# Patient Record
Sex: FEMALE | Race: WHITE | NOT HISPANIC OR LATINO | URBAN - METROPOLITAN AREA
[De-identification: names, ages, dates, MRNs, and addresses within clinical notes are randomized per-mention and may not be internally consistent; named-entity substitution may affect disease eponyms.]

---

## 2023-08-15 ENCOUNTER — TELEPHONE (OUTPATIENT)
Dept: PEDIATRICS | Facility: CLINIC | Age: 23
End: 2023-08-15
Payer: COMMERCIAL

## 2023-08-23 ENCOUNTER — OFFICE VISIT (OUTPATIENT)
Dept: PEDIATRICS | Facility: CLINIC | Age: 23
End: 2023-08-23
Payer: COMMERCIAL

## 2023-08-23 VITALS — WEIGHT: 248 LBS | BODY MASS INDEX: 40.64 KG/M2 | TEMPERATURE: 95.8 F

## 2023-08-23 DIAGNOSIS — F90.9 ATTENTION DEFICIT HYPERACTIVITY DISORDER (ADHD), UNSPECIFIED ADHD TYPE: Primary | ICD-10-CM

## 2023-08-23 PROCEDURE — 99213 OFFICE O/P EST LOW 20 MIN: CPT | Performed by: PEDIATRICS

## 2023-08-23 RX ORDER — LISDEXAMFETAMINE DIMESYLATE 40 MG/1
40 CAPSULE ORAL DAILY
COMMUNITY
End: 2023-08-23 | Stop reason: SDUPTHER

## 2023-08-23 RX ORDER — LISDEXAMFETAMINE DIMESYLATE 40 MG/1
40 CAPSULE ORAL DAILY
Qty: 30 CAPSULE | Refills: 0 | Status: SHIPPED | OUTPATIENT
Start: 2023-08-23

## 2023-08-23 NOTE — PROGRESS NOTES
Subjective   Patient ID: Ruth Powers is a 23 y.o. female who presents for MED CHECK.  HPI  Only current medication is Vyvanse 40mg  Just graduated college- gap year  International Development degree, ? Grad school  Happy with dose  No appetite or sleep disturbance  Living at home  Renewed controlled substance agreement  Review of Systems    Objective   Physical Exam  Constitutional:       Appearance: Normal appearance.   Neurological:      Mental Status: She is alert.         Assessment/Plan        Refilled rx  Will transition to adult PCP

## 2023-10-03 ENCOUNTER — LAB REQUISITION (OUTPATIENT)
Dept: LAB | Facility: HOSPITAL | Age: 23
End: 2023-10-03
Payer: COMMERCIAL

## 2023-10-03 DIAGNOSIS — E66.01 MORBID (SEVERE) OBESITY DUE TO EXCESS CALORIES (MULTI): ICD-10-CM

## 2023-10-03 LAB
ALBUMIN SERPL-MCNC: 4.3 G/DL (ref 3.5–5)
ALP BLD-CCNC: 114 U/L (ref 35–125)
ALT SERPL-CCNC: 28 U/L (ref 5–40)
ANION GAP SERPL CALC-SCNC: 13 MMOL/L
AST SERPL-CCNC: 24 U/L (ref 5–40)
BILIRUB SERPL-MCNC: 0.4 MG/DL (ref 0.1–1.2)
BUN SERPL-MCNC: 11 MG/DL (ref 8–25)
CALCIUM SERPL-MCNC: 9.5 MG/DL (ref 8.5–10.4)
CHLORIDE SERPL-SCNC: 103 MMOL/L (ref 97–107)
CHOLEST SERPL-MCNC: 183 MG/DL (ref 133–200)
CHOLEST/HDLC SERPL: 4.9 {RATIO}
CO2 SERPL-SCNC: 23 MMOL/L (ref 24–31)
CREAT SERPL-MCNC: 0.6 MG/DL (ref 0.4–1.6)
ERYTHROCYTE [DISTWIDTH] IN BLOOD BY AUTOMATED COUNT: 12.5 % (ref 11.5–14.5)
GFR SERPL CREATININE-BSD FRML MDRD: >90 ML/MIN/1.73M*2
GLUCOSE SERPL-MCNC: 96 MG/DL (ref 65–99)
HCT VFR BLD AUTO: 45.8 % (ref 36–46)
HDLC SERPL-MCNC: 37 MG/DL
HGB BLD-MCNC: 14.4 G/DL (ref 12–16)
LDLC SERPL CALC-MCNC: 98 MG/DL (ref 65–130)
MCH RBC QN AUTO: 29 PG (ref 26–34)
MCHC RBC AUTO-ENTMCNC: 31.4 G/DL (ref 32–36)
MCV RBC AUTO: 92 FL (ref 80–100)
NRBC BLD-RTO: 0.2 /100 WBCS (ref 0–0)
PLATELET # BLD AUTO: 304 X10*3/UL (ref 150–450)
PMV BLD AUTO: 10.2 FL (ref 7.5–11.5)
POTASSIUM SERPL-SCNC: 4.6 MMOL/L (ref 3.4–5.1)
PROT SERPL-MCNC: 6.5 G/DL (ref 5.9–7.9)
RBC # BLD AUTO: 4.96 X10*6/UL (ref 4–5.2)
SODIUM SERPL-SCNC: 139 MMOL/L (ref 133–145)
TRIGL SERPL-MCNC: 242 MG/DL (ref 40–150)
TSH SERPL DL<=0.05 MIU/L-ACNC: 2.85 MIU/L (ref 0.27–4.2)
WBC # BLD AUTO: 10.2 X10*3/UL (ref 4.4–11.3)

## 2023-10-03 PROCEDURE — 80061 LIPID PANEL: CPT

## 2023-10-03 PROCEDURE — 85027 COMPLETE CBC AUTOMATED: CPT

## 2023-10-03 PROCEDURE — 84443 ASSAY THYROID STIM HORMONE: CPT

## 2023-10-03 PROCEDURE — 80053 COMPREHEN METABOLIC PANEL: CPT

## 2024-02-16 ENCOUNTER — OFFICE VISIT (OUTPATIENT)
Dept: OPHTHALMOLOGY | Facility: CLINIC | Age: 24
End: 2024-02-16
Payer: COMMERCIAL

## 2024-02-16 DIAGNOSIS — H52.13 MYOPIA OF BOTH EYES: Primary | ICD-10-CM

## 2024-02-16 PROCEDURE — 92014 COMPRE OPH EXAM EST PT 1/>: CPT | Performed by: OPTOMETRIST

## 2024-02-16 PROCEDURE — 92015 DETERMINE REFRACTIVE STATE: CPT | Performed by: OPTOMETRIST

## 2024-02-16 PROCEDURE — 92310 CONTACT LENS FITTING OU: CPT | Performed by: OPTOMETRIST

## 2024-02-16 ASSESSMENT — REFRACTION_CURRENTRX
OD_DIAMETER: 14.2
OS_DIAMETER: 14.2
OS_BRAND: PROCLEAR 1 DAY
OS_DIAMETER: 14.2
OS_BASECURVE: 8.7
OD_DIAMETER: 14.2
OD_BASECURVE: 8.7
OD_SPHERE: -2.25
OS_SPHERE: -2.75
OD_BRAND: PROCLEAR 1 DAY
OD_BRAND: PROCLEAR 1 DAY
OS_SPHERE: -2.25
OS_BASECURVE: 8.7
OS_BRAND: PROCLEAR 1 DAY
OS_CYLINDER: SPHERE
OD_BASECURVE: 8.7
OS_CYLINDER: SPHERE
OD_SPHERE: -2.75
OD_CYLINDER: SPHERE
OD_CYLINDER: SPHERE

## 2024-02-16 ASSESSMENT — CONF VISUAL FIELD
OD_NORMAL: 1
OD_INFERIOR_NASAL_RESTRICTION: 0
OD_SUPERIOR_TEMPORAL_RESTRICTION: 0
OS_SUPERIOR_NASAL_RESTRICTION: 0
OS_INFERIOR_NASAL_RESTRICTION: 0
OD_INFERIOR_TEMPORAL_RESTRICTION: 0
OS_NORMAL: 1
OS_INFERIOR_TEMPORAL_RESTRICTION: 0
OS_SUPERIOR_TEMPORAL_RESTRICTION: 0
OD_SUPERIOR_NASAL_RESTRICTION: 0

## 2024-02-16 ASSESSMENT — TONOMETRY
IOP_METHOD: GOLDMANN APPLANATION
OS_IOP_MMHG: 17
OD_IOP_MMHG: 17

## 2024-02-16 ASSESSMENT — CUP TO DISC RATIO
OD_RATIO: .4
OS_RATIO: .4

## 2024-02-16 ASSESSMENT — EXTERNAL EXAM - RIGHT EYE: OD_EXAM: NORMAL

## 2024-02-16 ASSESSMENT — VISUAL ACUITY
OD_CC: 20/20
VA_OR_OS_CURRENT_RX: 20/20
VA_OR_OS_CURRENT_RX: 20/20
METHOD: SNELLEN - LINEAR
CORRECTION_TYPE: CONTACTS
VA_OR_OD_CURRENT_RX: 20/20
OS_CC: 20/25
VA_OR_OD_CURRENT_RX: 20/20
OS_CC+: +1

## 2024-02-16 ASSESSMENT — REFRACTION_MANIFEST
OS_CYLINDER: -0.75
OD_CYLINDER: -1.25
OD_AXIS: 175
OS_SPHERE: -2.50
OD_SPHERE: -2.75
OS_AXIS: 010

## 2024-02-16 ASSESSMENT — SLIT LAMP EXAM - LIDS
COMMENTS: GOOD POSITION
COMMENTS: GOOD POSITION

## 2024-02-16 ASSESSMENT — EXTERNAL EXAM - LEFT EYE: OS_EXAM: NORMAL

## 2024-02-16 NOTE — PROGRESS NOTES
A spectacle prescription was given at the patient`s request.  A contact lens prescription was dispensed at the patient`s request.  Mild change was made. Patient is seeing haloes, and may need astigmatism correction in the future if astigmatism gets worse or symptoms worsen. Gets eye migraines and will monitor. The patient was asked to return to our clinic in one year or sooner if ocular or vision changes occur.

## 2024-04-17 ENCOUNTER — PHARMACY VISIT (OUTPATIENT)
Dept: PHARMACY | Facility: CLINIC | Age: 24
End: 2024-04-17
Payer: COMMERCIAL

## 2024-04-17 PROCEDURE — RXMED WILLOW AMBULATORY MEDICATION CHARGE

## 2025-02-26 ENCOUNTER — APPOINTMENT (OUTPATIENT)
Dept: OPHTHALMOLOGY | Facility: CLINIC | Age: 25
End: 2025-02-26
Payer: COMMERCIAL

## 2025-02-26 DIAGNOSIS — H52.13 MYOPIA OF BOTH EYES: Primary | ICD-10-CM

## 2025-02-26 PROCEDURE — 92015 DETERMINE REFRACTIVE STATE: CPT | Performed by: OPTOMETRIST

## 2025-02-26 PROCEDURE — 92310 CONTACT LENS FITTING OU: CPT | Performed by: OPTOMETRIST

## 2025-02-26 PROCEDURE — 92014 COMPRE OPH EXAM EST PT 1/>: CPT | Performed by: OPTOMETRIST

## 2025-02-26 ASSESSMENT — REFRACTION_CURRENTRX
OS_BASECURVE: 8.7
OD_CYLINDER: SPHERE
OS_CYLINDER: SPHERE
OD_DIAMETER: 14.2
OD_BASECURVE: 8.7
OS_BRAND: PROCLEAR 1 DAY
OD_BRAND: PROCLEAR 1 DAY
OS_DIAMETER: 14.2
OS_SPHERE: -2.75
OD_SPHERE: -2.75

## 2025-02-26 ASSESSMENT — REFRACTION_MANIFEST
OD_CYLINDER: -1.50
OD_CYLINDER: -0.75
OD_AXIS: 170
METHOD_AUTOREFRACTION: 1
OD_SPHERE: -1.50
OS_AXIS: 005
OS_CYLINDER: -1.50
OS_SPHERE: -2.25
OD_SPHERE: -2.50
OD_AXIS: 175
OS_AXIS: 180
OS_CYLINDER: -0.50
OS_SPHERE: -2.00

## 2025-02-26 ASSESSMENT — VISUAL ACUITY
OS_CC: 20/25
OD_CC+: -1
CORRECTION_TYPE: CONTACTS
OS_CC+: -1
VA_OR_OS_CURRENT_RX: 20/20-
OS_CC: 20/20
VA_OR_OD_CURRENT_RX: 20/20-
OD_CC: 20/25
OD_CC: 20/20
METHOD: SNELLEN - LINEAR

## 2025-02-26 ASSESSMENT — TONOMETRY
IOP_METHOD: GOLDMANN APPLANATION
OS_IOP_MMHG: 17
OD_IOP_MMHG: 16

## 2025-02-26 ASSESSMENT — EXTERNAL EXAM - LEFT EYE: OS_EXAM: NORMAL

## 2025-02-26 ASSESSMENT — REFRACTION_WEARINGRX
OS_AXIS: 010
OS_CYLINDER: -0.75
OD_CYLINDER: -1.25
OD_SPHERE: -2.75
OS_SPHERE: -2.50
OD_AXIS: 175

## 2025-02-26 ASSESSMENT — CONF VISUAL FIELD
OD_INFERIOR_TEMPORAL_RESTRICTION: 0
OS_SUPERIOR_TEMPORAL_RESTRICTION: 0
OD_SUPERIOR_TEMPORAL_RESTRICTION: 0
OD_NORMAL: 1
OS_SUPERIOR_NASAL_RESTRICTION: 0
METHOD: COUNTING FINGERS
OD_SUPERIOR_NASAL_RESTRICTION: 0
OS_NORMAL: 1
OD_INFERIOR_NASAL_RESTRICTION: 0
OS_INFERIOR_TEMPORAL_RESTRICTION: 0
OS_INFERIOR_NASAL_RESTRICTION: 0

## 2025-02-26 ASSESSMENT — EXTERNAL EXAM - RIGHT EYE: OD_EXAM: NORMAL

## 2025-02-26 ASSESSMENT — CUP TO DISC RATIO
OS_RATIO: .4
OD_RATIO: .4

## 2025-02-26 ASSESSMENT — SLIT LAMP EXAM - LIDS
COMMENTS: GOOD POSITION
COMMENTS: GOOD POSITION

## 2025-02-26 ASSESSMENT — ENCOUNTER SYMPTOMS: EYES NEGATIVE: 1

## 2025-02-26 NOTE — PROGRESS NOTES
A spectacle prescription was given at the patient`s request.      A contact lens prescription was dispensed at the patient`s request.  Mild change was made.     Patient is seeing haloes, has improved.  May need astigmatism correction in the future if astigmatism gets worse or symptoms worsen.     Gets eye migraines and will monitor.     The patient was asked to return to our clinic in one year or sooner if ocular or vision changes occur.

## 2026-03-03 ENCOUNTER — APPOINTMENT (OUTPATIENT)
Dept: OPHTHALMOLOGY | Facility: CLINIC | Age: 26
End: 2026-03-03
Payer: COMMERCIAL